# Patient Record
Sex: MALE | Race: WHITE | Employment: OTHER | URBAN - METROPOLITAN AREA
[De-identification: names, ages, dates, MRNs, and addresses within clinical notes are randomized per-mention and may not be internally consistent; named-entity substitution may affect disease eponyms.]

---

## 2018-01-15 ENCOUNTER — TRANSFERRED RECORDS (OUTPATIENT)
Dept: HEALTH INFORMATION MANAGEMENT | Facility: CLINIC | Age: 83
End: 2018-01-15

## 2018-01-15 LAB — EJECTION FRACTION: 55

## 2019-04-17 ENCOUNTER — TRANSFERRED RECORDS (OUTPATIENT)
Dept: HEALTH INFORMATION MANAGEMENT | Facility: CLINIC | Age: 84
End: 2019-04-17

## 2019-06-17 ENCOUNTER — TRANSFERRED RECORDS (OUTPATIENT)
Dept: HEALTH INFORMATION MANAGEMENT | Facility: CLINIC | Age: 84
End: 2019-06-17

## 2019-09-12 ENCOUNTER — MEDICAL CORRESPONDENCE (OUTPATIENT)
Dept: HEALTH INFORMATION MANAGEMENT | Facility: CLINIC | Age: 84
End: 2019-09-12

## 2019-09-13 NOTE — PATIENT INSTRUCTIONS
Continue current medications.    Follow up with pulmonology as scheduled. Will also have you see cardiology.    The spots on your head may blister but allow the blisters to pop on their own.  If the spots have not gone away within 1 month, let me know.    Will check cholesterol and sodium today.    Will recheck sodium monthly.    Follow up in 3 months for a recheck.
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
## 2019-09-19 ENCOUNTER — OFFICE VISIT (OUTPATIENT)
Dept: FAMILY MEDICINE | Facility: OTHER | Age: 84
End: 2019-09-19
Payer: COMMERCIAL

## 2019-09-19 VITALS
BODY MASS INDEX: 23.9 KG/M2 | OXYGEN SATURATION: 100 % | HEART RATE: 106 BPM | SYSTOLIC BLOOD PRESSURE: 122 MMHG | HEIGHT: 64 IN | DIASTOLIC BLOOD PRESSURE: 78 MMHG | RESPIRATION RATE: 18 BRPM | TEMPERATURE: 98.2 F | WEIGHT: 140 LBS

## 2019-09-19 DIAGNOSIS — J84.112 IPF (IDIOPATHIC PULMONARY FIBROSIS) (H): ICD-10-CM

## 2019-09-19 DIAGNOSIS — E22.2 SIADH (SYNDROME OF INAPPROPRIATE ADH PRODUCTION) (H): ICD-10-CM

## 2019-09-19 DIAGNOSIS — L82.1 SEBORRHEIC KERATOSES: ICD-10-CM

## 2019-09-19 DIAGNOSIS — I71.20 THORACIC AORTIC ANEURYSM WITHOUT RUPTURE (H): Primary | ICD-10-CM

## 2019-09-19 DIAGNOSIS — K21.00 GASTROESOPHAGEAL REFLUX DISEASE WITH ESOPHAGITIS: ICD-10-CM

## 2019-09-19 DIAGNOSIS — F41.0 PANIC ATTACK: ICD-10-CM

## 2019-09-19 DIAGNOSIS — E78.5 HYPERLIPIDEMIA LDL GOAL <70: ICD-10-CM

## 2019-09-19 DIAGNOSIS — Z23 NEED FOR PROPHYLACTIC VACCINATION AND INOCULATION AGAINST INFLUENZA: ICD-10-CM

## 2019-09-19 DIAGNOSIS — I10 BENIGN ESSENTIAL HYPERTENSION: ICD-10-CM

## 2019-09-19 DIAGNOSIS — R25.1 TREMOR: ICD-10-CM

## 2019-09-19 DIAGNOSIS — F41.9 ANXIETY: ICD-10-CM

## 2019-09-19 DIAGNOSIS — I25.10 CORONARY ARTERY DISEASE INVOLVING NATIVE CORONARY ARTERY OF NATIVE HEART WITHOUT ANGINA PECTORIS: ICD-10-CM

## 2019-09-19 DIAGNOSIS — K22.0 ACHALASIA: ICD-10-CM

## 2019-09-19 DIAGNOSIS — H40.9 GLAUCOMA OF LEFT EYE, UNSPECIFIED GLAUCOMA TYPE: ICD-10-CM

## 2019-09-19 DIAGNOSIS — Z85.828 HISTORY OF NONMELANOMA SKIN CANCER: ICD-10-CM

## 2019-09-19 DIAGNOSIS — E87.1 HYPONATREMIA: ICD-10-CM

## 2019-09-19 LAB
ANION GAP SERPL CALCULATED.3IONS-SCNC: 8 MMOL/L (ref 3–14)
BUN SERPL-MCNC: 25 MG/DL (ref 7–30)
CALCIUM SERPL-MCNC: 9.3 MG/DL (ref 8.5–10.1)
CHLORIDE SERPL-SCNC: 96 MMOL/L (ref 94–109)
CHOLEST SERPL-MCNC: 152 MG/DL
CO2 SERPL-SCNC: 28 MMOL/L (ref 20–32)
CREAT SERPL-MCNC: 1.33 MG/DL (ref 0.66–1.25)
GFR SERPL CREATININE-BSD FRML MDRD: 49 ML/MIN/{1.73_M2}
GLUCOSE SERPL-MCNC: 111 MG/DL (ref 70–99)
HDLC SERPL-MCNC: 50 MG/DL
LDLC SERPL CALC-MCNC: 82 MG/DL
NONHDLC SERPL-MCNC: 102 MG/DL
POTASSIUM SERPL-SCNC: 5.4 MMOL/L (ref 3.4–5.3)
SODIUM SERPL-SCNC: 132 MMOL/L (ref 133–144)
TRIGL SERPL-MCNC: 102 MG/DL

## 2019-09-19 PROCEDURE — 80048 BASIC METABOLIC PNL TOTAL CA: CPT | Performed by: PHYSICIAN ASSISTANT

## 2019-09-19 PROCEDURE — 99204 OFFICE O/P NEW MOD 45 MIN: CPT | Mod: 25 | Performed by: PHYSICIAN ASSISTANT

## 2019-09-19 PROCEDURE — 36415 COLL VENOUS BLD VENIPUNCTURE: CPT | Performed by: PHYSICIAN ASSISTANT

## 2019-09-19 PROCEDURE — G0008 ADMIN INFLUENZA VIRUS VAC: HCPCS | Performed by: PHYSICIAN ASSISTANT

## 2019-09-19 PROCEDURE — 80061 LIPID PANEL: CPT | Performed by: PHYSICIAN ASSISTANT

## 2019-09-19 PROCEDURE — 90662 IIV NO PRSV INCREASED AG IM: CPT | Performed by: PHYSICIAN ASSISTANT

## 2019-09-19 PROCEDURE — 17110 DESTRUCTION B9 LES UP TO 14: CPT | Performed by: PHYSICIAN ASSISTANT

## 2019-09-19 RX ORDER — CLOPIDOGREL BISULFATE 75 MG/1
75 TABLET ORAL DAILY
COMMUNITY
End: 2019-09-19

## 2019-09-19 RX ORDER — WADDING
EACH MISCELLANEOUS DAILY
COMMUNITY

## 2019-09-19 RX ORDER — AMITRIPTYLINE HYDROCHLORIDE 10 MG/1
10 TABLET ORAL AT BEDTIME
Qty: 90 TABLET | Refills: 3 | Status: SHIPPED | OUTPATIENT
Start: 2019-09-19

## 2019-09-19 RX ORDER — EZETIMIBE 10 MG/1
10 TABLET ORAL DAILY
Qty: 90 TABLET | Refills: 3 | Status: SHIPPED | OUTPATIENT
Start: 2019-09-19

## 2019-09-19 RX ORDER — LATANOPROST 50 UG/ML
1 SOLUTION/ DROPS OPHTHALMIC DAILY
COMMUNITY

## 2019-09-19 RX ORDER — ALPRAZOLAM 0.5 MG
0.5 TABLET ORAL 3 TIMES DAILY
COMMUNITY
End: 2019-12-06

## 2019-09-19 RX ORDER — BISOPROLOL FUMARATE 5 MG/1
2.5 TABLET, FILM COATED ORAL DAILY
Qty: 45 TABLET | Refills: 3 | Status: SHIPPED | OUTPATIENT
Start: 2019-09-19

## 2019-09-19 RX ORDER — CARBIDOPA AND LEVODOPA 25; 100 MG/1; MG/1
1 TABLET ORAL 3 TIMES DAILY
Qty: 270 TABLET | Refills: 3 | Status: SHIPPED | OUTPATIENT
Start: 2019-09-19

## 2019-09-19 RX ORDER — EZETIMIBE 10 MG/1
10 TABLET ORAL DAILY
COMMUNITY
End: 2019-09-19

## 2019-09-19 RX ORDER — AMITRIPTYLINE HYDROCHLORIDE 10 MG/1
10 TABLET ORAL AT BEDTIME
COMMUNITY
End: 2019-09-19

## 2019-09-19 RX ORDER — LOSARTAN POTASSIUM 25 MG/1
25 TABLET ORAL DAILY
Qty: 90 TABLET | Refills: 3 | Status: SHIPPED | OUTPATIENT
Start: 2019-09-19 | End: 2019-10-21

## 2019-09-19 RX ORDER — ALPRAZOLAM 0.5 MG
0.5 TABLET ORAL 3 TIMES DAILY PRN
Qty: 90 TABLET | Refills: 2 | Status: SHIPPED | OUTPATIENT
Start: 2019-09-19 | End: 2019-12-06

## 2019-09-19 RX ORDER — CARBIDOPA AND LEVODOPA 25; 100 MG/1; MG/1
1 TABLET ORAL 3 TIMES DAILY
COMMUNITY
End: 2019-09-19

## 2019-09-19 RX ORDER — CLOPIDOGREL BISULFATE 75 MG/1
75 TABLET ORAL DAILY
Qty: 90 TABLET | Refills: 3 | Status: SHIPPED | OUTPATIENT
Start: 2019-09-19

## 2019-09-19 RX ORDER — LOSARTAN POTASSIUM 25 MG/1
25 TABLET ORAL DAILY
COMMUNITY
End: 2019-09-19

## 2019-09-19 ASSESSMENT — ACTIVITIES OF DAILY LIVING (ADL): CURRENT_FUNCTION: NO ASSISTANCE NEEDED

## 2019-09-19 ASSESSMENT — MIFFLIN-ST. JEOR: SCORE: 1241.04

## 2019-10-09 ENCOUNTER — TRANSFERRED RECORDS (OUTPATIENT)
Dept: HEALTH INFORMATION MANAGEMENT | Facility: CLINIC | Age: 84
End: 2019-10-09

## 2019-10-17 DIAGNOSIS — E22.2 SIADH (SYNDROME OF INAPPROPRIATE ADH PRODUCTION) (H): ICD-10-CM

## 2019-10-17 DIAGNOSIS — E87.5 HYPERKALEMIA: Primary | ICD-10-CM

## 2019-10-17 DIAGNOSIS — E87.1 HYPONATREMIA: ICD-10-CM

## 2019-10-17 LAB
ANION GAP SERPL CALCULATED.3IONS-SCNC: 4 MMOL/L (ref 3–14)
BUN SERPL-MCNC: 38 MG/DL (ref 7–30)
CALCIUM SERPL-MCNC: 9.4 MG/DL (ref 8.5–10.1)
CHLORIDE SERPL-SCNC: 98 MMOL/L (ref 94–109)
CO2 SERPL-SCNC: 31 MMOL/L (ref 20–32)
CREAT SERPL-MCNC: 1.45 MG/DL (ref 0.66–1.25)
GFR SERPL CREATININE-BSD FRML MDRD: 44 ML/MIN/{1.73_M2}
GLUCOSE SERPL-MCNC: 94 MG/DL (ref 70–99)
POTASSIUM SERPL-SCNC: 5.9 MMOL/L (ref 3.4–5.3)
SODIUM SERPL-SCNC: 133 MMOL/L (ref 133–144)

## 2019-10-17 PROCEDURE — 36415 COLL VENOUS BLD VENIPUNCTURE: CPT | Performed by: PHYSICIAN ASSISTANT

## 2019-10-17 PROCEDURE — 80048 BASIC METABOLIC PNL TOTAL CA: CPT | Performed by: PHYSICIAN ASSISTANT

## 2019-10-21 DIAGNOSIS — E87.5 HYPERKALEMIA: ICD-10-CM

## 2019-10-21 LAB
ANION GAP SERPL CALCULATED.3IONS-SCNC: 6 MMOL/L (ref 3–14)
BUN SERPL-MCNC: 37 MG/DL (ref 7–30)
CALCIUM SERPL-MCNC: 9 MG/DL (ref 8.5–10.1)
CHLORIDE SERPL-SCNC: 99 MMOL/L (ref 94–109)
CO2 SERPL-SCNC: 31 MMOL/L (ref 20–32)
CREAT SERPL-MCNC: 1.42 MG/DL (ref 0.66–1.25)
GFR SERPL CREATININE-BSD FRML MDRD: 45 ML/MIN/{1.73_M2}
GLUCOSE SERPL-MCNC: 89 MG/DL (ref 70–99)
POTASSIUM SERPL-SCNC: 4.8 MMOL/L (ref 3.4–5.3)
SODIUM SERPL-SCNC: 136 MMOL/L (ref 133–144)

## 2019-10-21 PROCEDURE — 80048 BASIC METABOLIC PNL TOTAL CA: CPT | Performed by: PHYSICIAN ASSISTANT

## 2019-10-21 PROCEDURE — 36415 COLL VENOUS BLD VENIPUNCTURE: CPT | Performed by: PHYSICIAN ASSISTANT

## 2019-10-31 ENCOUNTER — HOSPITAL ENCOUNTER (OUTPATIENT)
Dept: CARDIOLOGY | Facility: CLINIC | Age: 84
Discharge: HOME | End: 2019-10-31
Attending: INTERNAL MEDICINE | Admitting: INTERNAL MEDICINE
Payer: COMMERCIAL

## 2019-10-31 ENCOUNTER — OFFICE VISIT (OUTPATIENT)
Dept: CARDIOLOGY | Facility: CLINIC | Age: 84
End: 2019-10-31
Attending: PHYSICIAN ASSISTANT
Payer: COMMERCIAL

## 2019-10-31 VITALS
HEART RATE: 104 BPM | DIASTOLIC BLOOD PRESSURE: 74 MMHG | OXYGEN SATURATION: 97 % | RESPIRATION RATE: 12 BRPM | WEIGHT: 142 LBS | BODY MASS INDEX: 23.66 KG/M2 | HEIGHT: 65 IN | SYSTOLIC BLOOD PRESSURE: 118 MMHG

## 2019-10-31 DIAGNOSIS — I71.20 THORACIC AORTIC ANEURYSM WITHOUT RUPTURE (H): ICD-10-CM

## 2019-10-31 DIAGNOSIS — I71.20 THORACIC AORTIC ANEURYSM WITHOUT RUPTURE (H): Primary | ICD-10-CM

## 2019-10-31 PROCEDURE — 93000 ELECTROCARDIOGRAM COMPLETE: CPT | Performed by: INTERNAL MEDICINE

## 2019-10-31 PROCEDURE — 99204 OFFICE O/P NEW MOD 45 MIN: CPT | Performed by: INTERNAL MEDICINE

## 2019-10-31 PROCEDURE — 93005 ELECTROCARDIOGRAM TRACING: CPT | Performed by: REHABILITATION PRACTITIONER

## 2019-10-31 ASSESSMENT — MIFFLIN-ST. JEOR: SCORE: 1260.99

## 2019-10-31 ASSESSMENT — PAIN SCALES - GENERAL: PAINLEVEL: NO PAIN (0)
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
## 2019-10-31 NOTE — LETTER
10/31/2019    Semaj Gonzalez PA-C  [address]  [facility] [address]    RE: Dc Bergeron       Dear Colleague,    I had the pleasure of seeing Dc Rubio in the [facility].    HPI and Plan:   See dictation(#[id number])    Orders Placed This Encounter   Procedures     Echocardiogram Complete       No orders of the defined types were placed in this encounter.      There are no discontinued medications.      Encounter Diagnosis   Name Primary?     Thoracic aortic aneurysm without rupture (H)        CURRENT MEDICATIONS:  Current Outpatient Medications   Medication Sig Dispense Refill     ALPRAZolam (XANAX) 0.5 MG tablet Take 0.5 mg by mouth 3 times daily       ALPRAZolam (XANAX) 0.5 MG tablet Take 1 tablet (0.5 mg) by mouth 3 times daily as needed for anxiety 90 tablet 2     amitriptyline (ELAVIL) 10 MG tablet Take 1 tablet (10 mg) by mouth At Bedtime 90 tablet 3     bisoprolol (ZEBETA) 5 MG tablet Take 0.5 tablets (2.5 mg) by mouth daily 45 tablet 3     BISOPROLOL FUMARATE PO Take 0.75 mg by mouth daily       carbidopa-levodopa (SINEMET)  MG tablet Take 1 tablet by mouth 3 times daily 270 tablet 3     clopidogrel (PLAVIX) 75 MG tablet Take 1 tablet (75 mg) by mouth daily 90 tablet 3     ezetimibe (ZETIA) 10 MG tablet Take 1 tablet (10 mg) by mouth daily 90 tablet 3     folic acid 5 MG CAPS Take by mouth daily       latanoprost (XALATAN) 0.005 % ophthalmic solution Place 1 drop Into the left eye daily       pediatric electrolyte (PEDIALYTE) SOLN solution Take by mouth daily       UNABLE TO FIND MEDICATION NAME: Salt Tablets - unsure of dose, 2 tablets TID         ALLERGIES   No Known Allergies    PAST MEDICAL HISTORY:  No past medical history on file.    PAST SURGICAL HISTORY:  No past surgical history on file.    FAMILY HISTORY:  No family history on file.    SOCIAL HISTORY:  Social History     Socioeconomic History     Marital status:      Spouse name: Not  on file     Number of children: Not on file     Years of education: Not on file     Highest education level: Not on file   Occupational History     Not on file   Social Needs     Financial resource strain: Not on file     Food insecurity:     Worry: Not on file     Inability: Not on file     Transportation needs:     Medical: Not on file     Non-medical: Not on file   Tobacco Use     Smoking status: Never Smoker     Smokeless tobacco: Never Used   Substance and Sexual Activity     Alcohol use: Not Currently     Drug use: Never     Sexual activity: Not on file   Lifestyle     Physical activity:     Days per week: Not on file     Minutes per session: Not on file     Stress: Not on file   Relationships     Social connections:     Talks on phone: Not on file     Gets together: Not on file     Attends Rastafari service: Not on file     Active member of club or organization: Not on file     Attends meetings of clubs or organizations: Not on file     Relationship status: Not on file     Intimate partner violence:     Fear of current or ex partner: Not on file     Emotionally abused: Not on file     Physically abused: Not on file     Forced sexual activity: Not on file   Other Topics Concern     Not on file   Social History Narrative     Not on file       Review of Systems:  Skin:  Negative       Eyes:  Positive for glasses;cataracts has one / had one fixed.   ENT:  Negative      Respiratory:    shortness of breath;dyspnea on exertion;cough;wheezing;clear expectorant grween in am clear by afternoon.    Cardiovascular:  Negative Positive for;chest pain;fatigue;exercise intolerance    Gastroenterology: Positive for   ISPDH  Genitourinary:  Negative      Musculoskeletal:  Negative      Neurologic:  Negative      Psychiatric:  Positive for sleep disturbances bathroom 3-5 times  Heme/Lymph/Imm:  Negative      Endocrine:  Negative        Physical Exam:  Vitals: /74 (BP Location: Right arm, Cuff Size: Adult Regular)   Pulse  "104   Resp 12   Ht 1.651 m (5' 5\")   Wt 64.4 kg (142 lb)   SpO2 97%   BMI 23.63 kg/m       Constitutional:           Skin:             Head:           Eyes:           Lymph:      ENT:           Neck:           Respiratory:            Cardiac:                                                           GI:           Extremities and Muscular Skeletal:                 Neurological:           Psych:             CC  Semaj Gonzalez PA-C  [address] [place], [place] [phone]                    Thank you for allowing me to participate in the care of your patient.      Sincerely,     Go Coker MD     [facility]    cc:   Semaj Gonzalez PA-C  [address] [phone]        "

## 2019-10-31 NOTE — PROGRESS NOTES
HPI and Plan:   See dictation(#[id number])    Orders Placed This Encounter   Procedures     Echocardiogram Complete       No orders of the defined types were placed in this encounter.      There are no discontinued medications.      Encounter Diagnosis   Name Primary?     Thoracic aortic aneurysm without rupture (H)        CURRENT MEDICATIONS:  Current Outpatient Medications   Medication Sig Dispense Refill     ALPRAZolam (XANAX) 0.5 MG tablet Take 0.5 mg by mouth 3 times daily       ALPRAZolam (XANAX) 0.5 MG tablet Take 1 tablet (0.5 mg) by mouth 3 times daily as needed for anxiety 90 tablet 2     amitriptyline (ELAVIL) 10 MG tablet Take 1 tablet (10 mg) by mouth At Bedtime 90 tablet 3     bisoprolol (ZEBETA) 5 MG tablet Take 0.5 tablets (2.5 mg) by mouth daily 45 tablet 3     BISOPROLOL FUMARATE PO Take 0.75 mg by mouth daily       carbidopa-levodopa (SINEMET)  MG tablet Take 1 tablet by mouth 3 times daily 270 tablet 3     clopidogrel (PLAVIX) 75 MG tablet Take 1 tablet (75 mg) by mouth daily 90 tablet 3     ezetimibe (ZETIA) 10 MG tablet Take 1 tablet (10 mg) by mouth daily 90 tablet 3     folic acid 5 MG CAPS Take by mouth daily       latanoprost (XALATAN) 0.005 % ophthalmic solution Place 1 drop Into the left eye daily       pediatric electrolyte (PEDIALYTE) SOLN solution Take by mouth daily       UNABLE TO FIND MEDICATION NAME: Salt Tablets - unsure of dose, 2 tablets TID         ALLERGIES   No Known Allergies    PAST MEDICAL HISTORY:  No past medical history on file.    PAST SURGICAL HISTORY:  No past surgical history on file.    FAMILY HISTORY:  No family history on file.    SOCIAL HISTORY:  Social History     Socioeconomic History     Marital status:      Spouse name: Not on file     Number of children: Not on file     Years of education: Not on file     Highest education level: Not on file   Occupational History     Not on file   Social Needs     Financial resource strain: Not on file     Food  "insecurity:     Worry: Not on file     Inability: Not on file     Transportation needs:     Medical: Not on file     Non-medical: Not on file   Tobacco Use     Smoking status: Never Smoker     Smokeless tobacco: Never Used   Substance and Sexual Activity     Alcohol use: Not Currently     Drug use: Never     Sexual activity: Not on file   Lifestyle     Physical activity:     Days per week: Not on file     Minutes per session: Not on file     Stress: Not on file   Relationships     Social connections:     Talks on phone: Not on file     Gets together: Not on file     Attends Amish service: Not on file     Active member of club or organization: Not on file     Attends meetings of clubs or organizations: Not on file     Relationship status: Not on file     Intimate partner violence:     Fear of current or ex partner: Not on file     Emotionally abused: Not on file     Physically abused: Not on file     Forced sexual activity: Not on file   Other Topics Concern     Not on file   Social History Narrative     Not on file       Review of Systems:  Skin:  Negative       Eyes:  Positive for glasses;cataracts has one / had one fixed.   ENT:  Negative      Respiratory:    shortness of breath;dyspnea on exertion;cough;wheezing;clear expectorant grween in am clear by afternoon.    Cardiovascular:  Negative Positive for;chest pain;fatigue;exercise intolerance    Gastroenterology: Positive for   ISPDH  Genitourinary:  Negative      Musculoskeletal:  Negative      Neurologic:  Negative      Psychiatric:  Positive for sleep disturbances bathroom 3-5 times  Heme/Lymph/Imm:  Negative      Endocrine:  Negative        Physical Exam:  Vitals: /74 (BP Location: Right arm, Cuff Size: Adult Regular)   Pulse 104   Resp 12   Ht 1.651 m (5' 5\")   Wt 64.4 kg (142 lb)   SpO2 97%   BMI 23.63 kg/m      Constitutional:           Skin:             Head:           Eyes:           Lymph:      ENT:           Neck:           Respiratory: "            Cardiac:                                                           GI:           Extremities and Muscular Skeletal:                 Neurological:           Psych:             CC  Semaj Gonzalez, PA-C  [address] [address]
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
## 2019-11-11 ENCOUNTER — HOSPITAL ENCOUNTER (OUTPATIENT)
Dept: CARDIOLOGY | Facility: CLINIC | Age: 84
Discharge: HOME | End: 2019-11-11
Attending: INTERNAL MEDICINE | Admitting: INTERNAL MEDICINE
Payer: COMMERCIAL

## 2019-11-11 DIAGNOSIS — I71.20 THORACIC AORTIC ANEURYSM WITHOUT RUPTURE (H): ICD-10-CM

## 2019-11-11 PROCEDURE — 93306 TTE W/DOPPLER COMPLETE: CPT

## 2019-11-11 PROCEDURE — 93306 TTE W/DOPPLER COMPLETE: CPT | Mod: 26 | Performed by: INTERNAL MEDICINE

## 2019-11-12 ENCOUNTER — DOCUMENTATION ONLY (OUTPATIENT)
Dept: CARDIOLOGY | Facility: CLINIC | Age: 84
End: 2019-11-12

## 2019-11-12 NOTE — PROGRESS NOTES
Mildly dilated ascending aorta.  Recommend repeat echo and f/u in one year- we can see him if stays back in the area, otherwise this can be followed back at his home town in [facility]    Thanks  Go

## 2019-11-12 NOTE — PROGRESS NOTES
Update called to patient with findings of the Echo. Recommendations of follow up in one year with repeat Echo, either here or in the [facility]. If any further cardiac changes arise while in the area, to give us a call back.   Verbalized understanding and no questions at this time.

## 2019-11-12 NOTE — PROGRESS NOTES
Echo done post OV on 10/31/19 at [facility] with Dr. Coker.     ------------------------------------------------------------  ECHO:   Interpretation Summary     The aortic Sinus(es) of Valsalva are borderline dilated at 3.7 cm and the  ascending aorta is Mildly dilated at 4.0 cm (The upper limit of normal is  3.7cm for aortic root diameter.)  The left ventricle is normal in size but the left ventricular cavity is small.  Consider volume depletion.  The left ventricular ejection fraction is normal with hyperdynamic left  ventricular function. The visual ejection fraction is estimated at 65-70% with  no regional wall motion abnormalities noted.  There is Grade I or early diastolic dysfunction but tiastolic Doppler findings  (E/E' ratio and/or other parameters) suggest left ventricular filling  pressures are normal.  There is trace to mild aortic regurgitation but a normal tricuspid aortic  valve otherwise.  There is trace to mild mitral regurgitation and a normal MV otherwise.     There is no comparison study available.  _____________________________________________________________________________  __        Left Ventricle  The left ventricle is normal in size. The left ventricular cavity is small.  There is normal left ventricular wall thickness. The left ventricular ejection  fraction is normal. The visual ejection fraction is estimated at 65-70%.  Hyperdynamic left ventricular function. Grade I or early diastolic  dysfunction. Diastolic Doppler findings (E/E' ratio and/or other parameters)  suggest left ventricular filling pressures are normal. Normal left ventricular  wall motion. No regional wall motion abnormalities noted. There is no thrombus  seen in the left ventricle.     Right Ventricle  Normal right ventricle structure and size. The right ventricular systolic  function is normal. Hyperdynamic right ventricular function.     Atria  Normal left atrial size. by volume measurement at 24 ml/m2. The ULNL  for LA  size is 34 ml/m2. Right atrial size is normal. Intact atrial septum.     Mitral Valve  The mitral valve is normal in structure and function. There is no evidence of  mitral valve prolapse. There is trace to mild mitral regurgitation. There is  no mitral valve stenosis.        Tricuspid Valve  The tricuspid valve is normal in structure and function. There is trace  tricuspid regurgitation. Right ventricular systolic pressure could not be  approximated due to inadequate tricuspid regurgitation.     Aortic Valve  The aortic valve is normal in structure and function. Normal tricuspid aortic  valve. There is trace to mild aortic regurgitation. No aortic stenosis is  present.     Pulmonic Valve  The pulmonic valve is not well seen, but is grossly normal. There is trace  pulmonic valvular regurgitation.     Vessels  Normal size aorta. The aortic Sinus(es) of Valsalva are borderline dilated. at  3.7 cm. (The upper limit of normal is 3.7cm for aortic root diameter.). The  ascending aorta is Mildly dilated. at 4.0 cm.     Pericardium  The pericardium appears normal. There is no pleural effusion.        Rhythm  Sinus rhythm was noted.  _____________________________________________________________________________  __  MMode/2D Measurements & Calculations  IVSd: 1.0 cm     LVIDd: 4.0 cm  LVIDs: 2.5 cm  LVPWd: 1.0 cm  FS: 36.6 %  LV mass(C)d: 133.1 grams  LV mass(C)dI: 76.6 grams/m2  Ao root diam: 3.7 cm  LA dimension: 3.3 cm  asc Aorta Diam: 4.0 cm  LA/Ao: 0.87  LA Volume (BP): 42.5 ml  LA Volume Index (BP): 24.4 ml/m2  RWT: 0.52           Doppler Measurements & Calculations  MV E max amna: 58.3 cm/sec  MV A max amna: 66.4 cm/sec  MV E/A: 0.88  MV dec slope: 414.6 cm/sec2  MV dec time: 0.14 sec  AI P1/2t: 514.4 msec  E/E' av.3  Lateral E/e': 7.1  Medial E/e': 7.5

## 2019-11-13 ENCOUNTER — OFFICE VISIT (OUTPATIENT)
Dept: FAMILY MEDICINE | Facility: OTHER | Age: 84
End: 2019-11-13
Payer: COMMERCIAL

## 2019-11-13 VITALS
SYSTOLIC BLOOD PRESSURE: 136 MMHG | WEIGHT: 146 LBS | TEMPERATURE: 99.2 F | OXYGEN SATURATION: 97 % | BODY MASS INDEX: 24.32 KG/M2 | HEIGHT: 65 IN | RESPIRATION RATE: 18 BRPM | DIASTOLIC BLOOD PRESSURE: 88 MMHG | HEART RATE: 97 BPM

## 2019-11-13 DIAGNOSIS — J20.9 ACUTE BRONCHITIS WITH COEXISTING CONDITION REQUIRING PROPHYLACTIC TREATMENT: Primary | ICD-10-CM

## 2019-11-13 PROCEDURE — 99213 OFFICE O/P EST LOW 20 MIN: CPT | Performed by: PHYSICIAN ASSISTANT

## 2019-11-13 RX ORDER — DOXYCYCLINE 100 MG/1
100 CAPSULE ORAL 2 TIMES DAILY
Qty: 20 CAPSULE | Refills: 0 | Status: SHIPPED | OUTPATIENT
Start: 2019-11-13 | End: 2019-12-06

## 2019-11-13 ASSESSMENT — MIFFLIN-ST. JEOR: SCORE: 1279.13

## 2019-11-13 NOTE — PROGRESS NOTES
"Subjective     Dc Bergeron is a 84 year old male who presents to clinic today for the following health issues:    HPI     Acute Illness   Acute illness concerns: cold/flu symptoms  Onset: Sunday     Fever: no    Chills/Sweats: \"last night I did\"     Headache (location?): YES - last night     Sinus Pressure:no    Conjunctivitis:  no    Ear Pain: no    Rhinorrhea: YES    Congestion: YES    Sore Throat: no     Cough: YES-productive of yellow sputum    Wheeze: no    Decreased Appetite: no    Nausea: no    Vomiting: no    Diarrhea:  no    Dysuria/Freq.: no    Fatigue/Achiness: no    Sick/Strep Exposure: no     Therapies Tried and outcome:     Productive cough and headache: Jhonathan received his flu shot and pneumonia vaccinations this year. He is worried about his history of pulmonary fibrosis and respiratory infections. He reports his symptoms including a headache, productive cough, and nasal congestion that began on Sunday. He has been coughing up yellow phlegm, denies hemoptysis. However, it is normal for him to have a productive cough. He denies increased difficulty breathing or fevers, just \"feeling warm\" last night. He denies any sick contacts or recent travel.  He has only used Robbie's Vapor Rub for his cough, but he took two Tylenol for HA.     Reviewed and updated as needed this visit by Provider  Allergies  Meds  Problems     Review of Systems   ROS COMP: Constitutional, HEENT, cardiovascular, pulmonary, gi and gu systems are negative, except as otherwise noted.      Objective    /88   Pulse 97   Temp 99.2  F (37.3  C) (Temporal)   Resp 18   Ht 1.651 m (5' 5\")   Wt 66.2 kg (146 lb)   SpO2 97%   BMI 24.30 kg/m    Body mass index is 24.3 kg/m .  Physical Exam   GENERAL: healthy, alert and mild distress; he uses a portable oxygen tank.   NECK: no adenopathy, no asymmetry, masses, or scars and thyroid normal to palpation  RESP: crackles present in bilateral lower lobe. No wheeze or rhonchi; " prdouctive cough present, no hemoptysis.   CV: regular rate and rhythm, normal S1 S2, no S3 or S4, no murmur, click or rub,         Assessment & Plan     ICD-10-CM    1. Acute bronchitis with coexisting condition requiring prophylactic treatment J20.9 doxycycline hyclate (VIBRAMYCIN) 100 MG capsule       1. Doxycycline 100 mg paper prescription was given to the patient. He is to monitor his symptoms if worsening, no improvement, or fevers by this weekend he is to begin the antibiotic. Due to his IPF, there is concern for progression to bacterial infection. He was instructed to use mucinex as needed for his cough along with plenty of fluids and rest. Follow up if not improving.     Patient was seen in conjunction with ROBERT KaplanS    Semaj Gonzalez PA-C  [facility]

## 2019-11-13 NOTE — PATIENT INSTRUCTIONS
Will treat you with doxycycline to take as directed to prevent progression to pneumonia if symptoms are not improved by the weekend.   Take a probiotic or daily Activia yogurt while on this.  I recommend plenty of fluids along with breathing in warm, moist air/humidifer.   Can use over the counter Mucinex to help with congestion and cough.  Honey and tea can help with cough.   If symptoms are not improving within the next week, you should be seen again.

## 2019-12-05 NOTE — PROGRESS NOTES
Subjective     Dc Bergeron is a 84 year old male who presents to clinic today for the following health issues:    HPI     Genitourinary - Male  Onset: 2 weeks    Description:   Dysuria (painful urination): YES- only at night  Hematuria (blood in urine): no   Frequency: YES- a lot at night time but cannot go  Are you urinating at night : YES- urgency but cannot go  Hesitancy (delay in urine): YES  Retention (unable to empty): YES  Decrease in urinary flow: YES  Incontinence: no     Progression of Symptoms:  worsening    Accompanying Signs & Symptoms:  Fever: no   Back/Flank pain: no   Urethral discharge: no   Testicle lumps/masses/pain: no   Nausea and/or vomiting: no   Abdominal pain: YES- states his bladder hurts    History:   History of frequent UTI's: YES- had surgery on his bladder 05/2019   History of kidney stones: no   History of hernias: YES- 2012  Personal or Family history of Prostate problems: YES- himself   Sexually active: no     Reviewed and updated as needed this visit by Provider  Tobacco  Allergies  Meds  Problems  Med Hx  Surg Hx  Fam Hx         Review of Systems   ROS COMP: Constitutional, HEENT, cardiovascular, pulmonary, GI, , musculoskeletal, neuro, skin, endocrine and psych systems are negative, except as otherwise noted.      Objective    /68   Pulse 92   Temp 98.2  F (36.8  C) (Temporal)   Resp 16   Wt 59.4 kg (131 lb)   BMI 21.80 kg/m    Body mass index is 21.8 kg/m .  Physical Exam  Constitutional:       Appearance: He is ill-appearing.      Comments: Nasal cannula in place   HENT:      Head: Normocephalic and atraumatic.      Right Ear: Tympanic membrane, ear canal and external ear normal. There is no impacted cerumen.      Left Ear: Tympanic membrane, ear canal and external ear normal. There is no impacted cerumen.      Nose: Nose normal.   Neck:      Musculoskeletal: Normal range of motion and neck supple.   Cardiovascular:      Rate and Rhythm: Normal rate and  regular rhythm.      Pulses: Normal pulses.      Heart sounds: Normal heart sounds. No murmur. No friction rub. No gallop.    Pulmonary:      Effort: No respiratory distress.      Breath sounds: Wheezing and rales present.   Abdominal:      General: Abdomen is flat. Bowel sounds are normal.   Neurological:      General: No focal deficit present.      Mental Status: He is alert and oriented to person, place, and time.   Psychiatric:         Mood and Affect: Mood normal.         Behavior: Behavior normal.         Thought Content: Thought content normal.         Judgment: Judgment normal.            Diagnostic Test Results:  Labs reviewed in Epic        Assessment & Plan      Patient is a pleasant 84-year-old with history of idiopathic pulmonary fibrosis resulting in chronic respiratory failure currently oxygen dependent, SIADH, thoracic aortic aneurysm, essential hypertension, coronary artery disease, anxiety presents to the clinic accompanied by his wife and son with symptoms of increased frequency and hesitancy likely due to benign prostate hypertrophy.  Discussed a trial of Flomax to help with the symptoms.  Will check urine to rule out urinary tract infection.  He is currently visiting his son and plans to return to [facility] lives with his wife.  He has been on Xanax to help with his anxiety.  His symptoms however have been poorly controlled.  Reports that Xanax c wears off at night resulting in him waking up too often at night.  Discussed a trial of Klonopin at night.  Can use Xanax in the morning and Klonopin at night.  Follow-up in a month if still living in [place].  Patient and his wife voiced understanding of the plan  Problem List Items Addressed This Visit     Anxiety      Other Visit Diagnoses     Urinary symptom or sign    -  Primary    Relevant Orders    *UA reflex to Microscopic and Culture ([facility] (except [place] and [place]) (Completed)    Urinary hesitancy         Relevant Medications    tamsulosin (FLOMAX) 0.4 MG capsule               See Patient Instructions  Return if symptoms worsen or fail to improve.    Shobha Echeverria MD  [facility]

## 2019-12-06 ENCOUNTER — OFFICE VISIT (OUTPATIENT)
Dept: FAMILY MEDICINE | Facility: OTHER | Age: 84
End: 2019-12-06
Payer: COMMERCIAL

## 2019-12-06 VITALS
HEART RATE: 92 BPM | BODY MASS INDEX: 21.8 KG/M2 | WEIGHT: 131 LBS | SYSTOLIC BLOOD PRESSURE: 116 MMHG | RESPIRATION RATE: 16 BRPM | TEMPERATURE: 98.2 F | DIASTOLIC BLOOD PRESSURE: 68 MMHG

## 2019-12-06 DIAGNOSIS — E87.1 HYPONATREMIA: ICD-10-CM

## 2019-12-06 DIAGNOSIS — I71.20 THORACIC AORTIC ANEURYSM WITHOUT RUPTURE (H): ICD-10-CM

## 2019-12-06 DIAGNOSIS — R39.11 URINARY HESITANCY: ICD-10-CM

## 2019-12-06 DIAGNOSIS — J84.112 IPF (IDIOPATHIC PULMONARY FIBROSIS) (H): ICD-10-CM

## 2019-12-06 DIAGNOSIS — E22.2 SIADH (SYNDROME OF INAPPROPRIATE ADH PRODUCTION) (H): ICD-10-CM

## 2019-12-06 DIAGNOSIS — F41.9 ANXIETY: ICD-10-CM

## 2019-12-06 DIAGNOSIS — R39.9 URINARY SYMPTOM OR SIGN: Primary | ICD-10-CM

## 2019-12-06 LAB
ALBUMIN UR-MCNC: NEGATIVE MG/DL
ANION GAP SERPL CALCULATED.3IONS-SCNC: 2 MMOL/L (ref 3–14)
APPEARANCE UR: CLEAR
BILIRUB UR QL STRIP: NEGATIVE
BUN SERPL-MCNC: 29 MG/DL (ref 7–30)
CALCIUM SERPL-MCNC: 9.3 MG/DL (ref 8.5–10.1)
CHLORIDE SERPL-SCNC: 98 MMOL/L (ref 94–109)
CO2 SERPL-SCNC: 35 MMOL/L (ref 20–32)
COLOR UR AUTO: YELLOW
CREAT SERPL-MCNC: 1.37 MG/DL (ref 0.66–1.25)
GFR SERPL CREATININE-BSD FRML MDRD: 47 ML/MIN/{1.73_M2}
GLUCOSE SERPL-MCNC: 98 MG/DL (ref 70–99)
GLUCOSE UR STRIP-MCNC: NEGATIVE MG/DL
HGB UR QL STRIP: NEGATIVE
KETONES UR STRIP-MCNC: NEGATIVE MG/DL
LEUKOCYTE ESTERASE UR QL STRIP: NEGATIVE
NITRATE UR QL: NEGATIVE
PH UR STRIP: 7.5 PH (ref 5–7)
POTASSIUM SERPL-SCNC: 4.6 MMOL/L (ref 3.4–5.3)
SODIUM SERPL-SCNC: 135 MMOL/L (ref 133–144)
SOURCE: ABNORMAL
SP GR UR STRIP: 1.01 (ref 1–1.03)
UROBILINOGEN UR STRIP-ACNC: 0.2 EU/DL (ref 0.2–1)

## 2019-12-06 PROCEDURE — 36415 COLL VENOUS BLD VENIPUNCTURE: CPT | Performed by: PHYSICIAN ASSISTANT

## 2019-12-06 PROCEDURE — 99214 OFFICE O/P EST MOD 30 MIN: CPT | Performed by: FAMILY MEDICINE

## 2019-12-06 PROCEDURE — 81003 URINALYSIS AUTO W/O SCOPE: CPT | Performed by: FAMILY MEDICINE

## 2019-12-06 PROCEDURE — 80048 BASIC METABOLIC PNL TOTAL CA: CPT | Performed by: PHYSICIAN ASSISTANT

## 2019-12-06 RX ORDER — ALPRAZOLAM 0.5 MG
0.5 TABLET ORAL 3 TIMES DAILY PRN
Qty: 90 TABLET | Refills: 0 | Status: SHIPPED | OUTPATIENT
Start: 2019-12-06 | End: 2019-12-31

## 2019-12-06 RX ORDER — TAMSULOSIN HYDROCHLORIDE 0.4 MG/1
0.4 CAPSULE ORAL DAILY
Qty: 60 CAPSULE | Refills: 0 | Status: SHIPPED | OUTPATIENT
Start: 2019-12-06

## 2019-12-09 ENCOUNTER — TELEPHONE (OUTPATIENT)
Dept: FAMILY MEDICINE | Facility: OTHER | Age: 84
End: 2019-12-09

## 2019-12-09 DIAGNOSIS — G47.00 INSOMNIA, UNSPECIFIED TYPE: Primary | ICD-10-CM

## 2019-12-09 DIAGNOSIS — F41.9 ANXIETY: ICD-10-CM

## 2019-12-09 NOTE — TELEPHONE ENCOUNTER
PATIENT IS AT PHARMACY, A NEW MEDICATION WAS SUPPOSE TO BE CALLED IN FOR TRAVON.   THAT IS NOT THERE YET. CAN YOU PLEASE CALL IT IN. [facility] COULD NOT REMEMBER THE NAME OF THE MED.  [facility]

## 2019-12-09 NOTE — TELEPHONE ENCOUNTER
Patient's son was calling about a prescription that was to be sent over today for anxiety to take only at bedtime. Was at [facility] pharmacy waiting for RX. I did not see anything sent over but confirmed that his Amitriptyline had refills. I did not actually speak with the son but Patient Representative states RK was to send something over for Anxiety at . Is there something else you wanted him on. Please advise. Thank you

## 2019-12-09 NOTE — TELEPHONE ENCOUNTER
"Patient's son calling back very upset. He \"has been waiting around [employer] for over an hour waiting for her to send over this prescription.\" \"Isn't she there so you can ask her or what?\" Informed him that Dr. Echeverria is not in clinic in the afternoons on M, T, and W. He was upset. \"Unbelievable. Isn't this documented in his chart because we had a long conversation about this and that's the only reason we brought him in.\" I told him the note wasn't completed yet, so unsure of plan. He then states \"I need you to send this message to her on priority because he is having a hard time at night and keeping us up all night.\"    Please review/advise. Not sure which anxiety medication he is talking about and \"it's not the Xanax.\" He said you were going to prescribe patient a pill he could take at night.    Please review/advise.  Amina Fournier, CMA  "

## 2019-12-10 RX ORDER — CLONAZEPAM 0.5 MG/1
.5-1 TABLET ORAL
Qty: 30 TABLET | Refills: 0 | Status: SHIPPED | OUTPATIENT
Start: 2019-12-10 | End: 2019-12-31

## 2019-12-10 NOTE — TELEPHONE ENCOUNTER
I was going to send to send klonopin for bedtime use in 3-5 days from the last script of xanax .  I called the patients son and explained I could not send both xanax and klonopin at the same time as both of them belong to the same class. He voiced understanding.

## 2019-12-30 ENCOUNTER — TELEPHONE (OUTPATIENT)
Dept: FAMILY MEDICINE | Facility: OTHER | Age: 84
End: 2019-12-30

## 2019-12-30 DIAGNOSIS — F41.9 ANXIETY: ICD-10-CM

## 2019-12-30 NOTE — TELEPHONE ENCOUNTER
Reason for Call:  Medication or medication refill:    Do you use a [facility]?  Name of the pharmacy and phone number for the current request:  [facility] [id number] [place] [facility], [place] - [phone] [address]    Name of the medication requested: ALPRAZolam (XANAX) 0.5 MG tablet     Other request: Patient was prescribed Alprazolam for mornings and clonazePAM for nights. He does not like the clonazepam and stayed on the alprazolam. He is traveling back to the [facility] on Sunday and will run out of the Alprazolam within 4 days of getting there and will not have enough time to get in to see a doctor there for a refill. Patient's son is requesting a refill to get through a few weeks and put in a request for insurance as a travel exception otherwise they may not allow a refill. Please advise.       Can we leave a detailed message on this number? YES    Phone number patient can be reached at: Home number on file [phone] (home)    Best Time: any    Call taken on 12/30/2019 at 8:53 AM by Nurys Daley

## 2019-12-30 NOTE — TELEPHONE ENCOUNTER
Note is not finished, unsure what plan was regarding the benzodiazepines.  RK in clinic 12/31/19, will forward for provider input.     ROBERT EllisC

## 2019-12-31 RX ORDER — ALPRAZOLAM 0.5 MG
0.5 TABLET ORAL 3 TIMES DAILY PRN
Qty: 90 TABLET | Refills: 0 | Status: SHIPPED | OUTPATIENT
Start: 2019-12-31

## 2020-01-03 NOTE — TELEPHONE ENCOUNTER
"Verbal given to pharmacist at [facility] per message below. Patient notified. States \"we already picked it up\".  Adrienne Fletcher MA on 1/3/2020 at 5:10 PM      "

## 2020-01-03 NOTE — TELEPHONE ENCOUNTER
[facility] is needing approval to fill medication early as patient is going out of town on the 5th.

## 2020-01-24 ENCOUNTER — MYC MEDICAL ADVICE (OUTPATIENT)
Dept: FAMILY MEDICINE | Facility: OTHER | Age: 85
End: 2020-01-24

## 2020-03-11 ENCOUNTER — HEALTH MAINTENANCE LETTER (OUTPATIENT)
Age: 85
End: 2020-03-11

## 2020-11-20 ENCOUNTER — TELEPHONE (OUTPATIENT)
Dept: FAMILY MEDICINE | Facility: OTHER | Age: 85
End: 2020-11-20

## 2020-11-20 NOTE — TELEPHONE ENCOUNTER
Panel Management Review    Summary:    Patient is due/failing the following:   PHYSICAL    Action needed:   Patient needs office visit for Physical.    Type of outreach:    Phone, left message for patient to call back.     Questions for provider review:    None                                                                                                                                    Morena Joseph CMA ([facility])       Chart routed to Care Team .        Patient has the following on his problem list:     Hypertension   Last three blood pressure readings:  BP Readings from Last 3 Encounters:   12/06/19 116/68   11/13/19 136/88   10/31/19 118/74     Blood pressure: Passed    HTN Guidelines:  Less than 140/90      Composite cancer screening  Chart review shows that this patient is due/due soon for the following None

## 2020-11-21 NOTE — TELEPHONE ENCOUNTER
LM for patient to return phone call to clinic about message below.  [employer] message sent.  Morena Joseph CMA ([facility])

## 2021-01-03 ENCOUNTER — HEALTH MAINTENANCE LETTER (OUTPATIENT)
Age: 86
End: 2021-01-03

## 2021-03-14 ENCOUNTER — MYC MEDICAL ADVICE (OUTPATIENT)
Dept: FAMILY MEDICINE | Facility: OTHER | Age: 86
End: 2021-03-14

## 2021-03-15 NOTE — TELEPHONE ENCOUNTER
deactivating [employer] and adding note to comments to notify staff he will no longer be utilizing [employer].  Leslie Lopez MA